# Patient Record
Sex: FEMALE | Race: WHITE | NOT HISPANIC OR LATINO
[De-identification: names, ages, dates, MRNs, and addresses within clinical notes are randomized per-mention and may not be internally consistent; named-entity substitution may affect disease eponyms.]

---

## 2017-07-18 ENCOUNTER — RESULT REVIEW (OUTPATIENT)
Age: 22
End: 2017-07-18

## 2018-06-30 ENCOUNTER — APPOINTMENT (OUTPATIENT)
Dept: PEDIATRICS | Facility: CLINIC | Age: 23
End: 2018-06-30
Payer: COMMERCIAL

## 2018-06-30 VITALS
HEIGHT: 62.75 IN | BODY MASS INDEX: 19.74 KG/M2 | DIASTOLIC BLOOD PRESSURE: 68 MMHG | WEIGHT: 110 LBS | HEART RATE: 97 BPM | SYSTOLIC BLOOD PRESSURE: 120 MMHG

## 2018-06-30 DIAGNOSIS — Z00.00 ENCOUNTER FOR GENERAL ADULT MEDICAL EXAMINATION W/OUT ABNORMAL FINDINGS: ICD-10-CM

## 2018-06-30 PROCEDURE — 99395 PREV VISIT EST AGE 18-39: CPT | Mod: 25

## 2018-06-30 PROCEDURE — 90632 HEPA VACCINE ADULT IM: CPT

## 2018-06-30 PROCEDURE — 90471 IMMUNIZATION ADMIN: CPT

## 2018-06-30 PROCEDURE — 90472 IMMUNIZATION ADMIN EACH ADD: CPT

## 2018-06-30 PROCEDURE — 90715 TDAP VACCINE 7 YRS/> IM: CPT

## 2018-06-30 RX ORDER — ERYTHROMYCIN 20 MG/G
2 GEL TOPICAL
Qty: 60 | Refills: 0 | Status: ACTIVE | COMMUNITY
Start: 2018-05-22

## 2018-06-30 RX ORDER — LEVOCETIRIZINE DIHYDROCHLORIDE 5 MG/1
5 TABLET ORAL
Qty: 10 | Refills: 0 | Status: DISCONTINUED | COMMUNITY
Start: 2018-02-21

## 2018-06-30 RX ORDER — NORETHINDRONE ACETATE AND ETHINYL ESTRADIOL, ETHINYL ESTRADIOL AND FERROUS FUMARATE 1MG-10(24)
1 MG-10 MCG / KIT ORAL
Qty: 84 | Refills: 0 | Status: ACTIVE | COMMUNITY
Start: 2017-09-13

## 2018-06-30 NOTE — PHYSICAL EXAM
[General Appearance - Well Developed] : interactive [General Appearance - Well-Appearing] : well appearing [General Appearance - In No Acute Distress] : in no acute distress [Appearance Of Head] : the head was normocephalic [Sclera] : the conjunctiva were normal [Outer Ear] : the ears and nose were normal in appearance [Both Tympanic Membranes Were Examined] : both tympanic membranes were normal [Nasal Cavity] : the nasal mucosa and septum were normal [Examination Of The Oral Cavity] : the teeth, gums, and palate were normal [Oropharynx] : the oropharynx was normal  [Neck Cervical Mass (___cm)] : no neck mass was observed [Respiration, Rhythm And Depth] : normal respiratory rhythm and effort [Auscultation Breath Sounds / Voice Sounds] : clear bilateral breath sounds [Heart Rate And Rhythm] : heart rate and rhythm were normal [Heart Sounds] : normal S1 and S2 [Murmurs] : no murmurs [Bowel Sounds] : normal bowel sounds [Abdomen Soft] : soft [Abdomen Tenderness] : non-tender [Abdominal Distention] : nondistended [Musculoskeletal Exam: Normal Movement Of All Extremities] : normal movements of all extremities [Motor Tone] : muscle strength and tone were normal [No Visual Abnormalities] : no visible abnormailities [Deep Tendon Reflexes (DTR)] : deep tendon reflexes were 2+ and symmetric [Generalized Lymph Node Enlargement] : no lymphadenopathy [Skin Color & Pigmentation] : normal skin color and pigmentation [] : no significant rash [FreeTextEntry1] : acne on face [Initial Inspection: Infant Active And Alert] : active and alert

## 2018-06-30 NOTE — HISTORY OF PRESENT ILLNESS
[Good] : good [Good Dental Hygiene] : Good [Up to Date] : Up to date [No Nutrition Concerns] : nutrition [No Sleep Concerns] : sleep [No Behavior Concerns] : behavior [No School Concerns] : school [No Developmental Concerns] : development [No Elimination Concerns] : elimination [Diverse, Healthy Diet] : her current diet is diverse and healthy [None] : No significant risk factors are identified [FreeTextEntry1] : No concerns.\par \par Acne - goes to Dr. Mack.\par \par Just graduated from college. Doing masters for educational psychology.\par Staying [place].\par \par Healthy well balanced diet; exercisees regularly. Likes to do elliptical.\par No chest pain. No SOB.\par \par Sleep is erratic but good.\par \par Works at  [employer] ( age). \par \par Regular menses. LMP last week. Last for 3-4 days. \par Currently on OCPs.\par \par Reports some subjective difficulty seeing out of R eye sometimes.

## 2018-06-30 NOTE — DEVELOPMENTAL MILESTONES
[Mother] : mother [Father] : father [___ Brothers] : [unfilled] brothers [0] : 2) Feeling down, depressed, or hopeless: Not at all (0) [[id number]] : 0 [Uses tobacco/alcohol/drugs] : does not use tobacco/alcohol/drugs [Sexually Active] : The patient is not sexually active

## 2018-06-30 NOTE — DISCUSSION/SUMMARY
[Normal Growth] : growth [Normal Development] : development  [None] : No known medical problems [No Elimination Concerns] : elimination [No Feeding Concerns] : feeding [No Skin Concerns] : skin [Normal Sleep Pattern] : sleep [Physical Growth and Development] : physical growth and development [Social and Academic Competence] : social and academic competence [Emotional Well-Being] : emotional well-being [Risk Reduction] : risk reduction [Violence and Injury Prevention] : violence and injury prevention [No Medications] : ~He/She~ is not on any medications [Patient] : patient [Mother] : mother [FreeTextEntry1] : 24 y/o F ex-preemie triplet here for well check.\par Doing well. No concerns.\par HEADDS negative.\par Pap 7/2017 - reportedly negative.\par - Tdap and Hep A#2 today\par - Anticipatory guidance as above\par - Transition to adult medicine.

## 2019-06-25 ENCOUNTER — RESULT REVIEW (OUTPATIENT)
Age: 24
End: 2019-06-25

## 2020-07-07 ENCOUNTER — RESULT REVIEW (OUTPATIENT)
Age: 25
End: 2020-07-07